# Patient Record
Sex: FEMALE | Race: BLACK OR AFRICAN AMERICAN | NOT HISPANIC OR LATINO | Employment: OTHER | URBAN - METROPOLITAN AREA
[De-identification: names, ages, dates, MRNs, and addresses within clinical notes are randomized per-mention and may not be internally consistent; named-entity substitution may affect disease eponyms.]

---

## 2020-03-12 ENCOUNTER — APPOINTMENT (OUTPATIENT)
Dept: LAB | Facility: HOSPITAL | Age: 40
End: 2020-03-12
Payer: COMMERCIAL

## 2020-03-12 ENCOUNTER — TRANSCRIBE ORDERS (OUTPATIENT)
Dept: LAB | Facility: HOSPITAL | Age: 40
End: 2020-03-12

## 2020-03-12 DIAGNOSIS — N93.9 ABNORMAL UTERINE BLEEDING: Primary | ICD-10-CM

## 2020-03-12 DIAGNOSIS — N93.9 ABNORMAL UTERINE BLEEDING: ICD-10-CM

## 2020-03-12 LAB
B-HCG SERPL-ACNC: ABNORMAL MIU/ML
ERYTHROCYTE [DISTWIDTH] IN BLOOD BY AUTOMATED COUNT: 15.6 % (ref 11.6–15.1)
HCT VFR BLD AUTO: 30.7 % (ref 34.8–46.1)
HGB BLD-MCNC: 9.3 G/DL (ref 11.5–15.4)
MCH RBC QN AUTO: 23.7 PG (ref 26.8–34.3)
MCHC RBC AUTO-ENTMCNC: 30.3 G/DL (ref 31.4–37.4)
MCV RBC AUTO: 78 FL (ref 82–98)
PLATELET # BLD AUTO: 297 THOUSANDS/UL (ref 149–390)
PMV BLD AUTO: 9.2 FL (ref 8.9–12.7)
RBC # BLD AUTO: 3.92 MILLION/UL (ref 3.81–5.12)
TSH SERPL DL<=0.05 MIU/L-ACNC: 0.54 UIU/ML (ref 0.36–3.74)
WBC # BLD AUTO: 4.71 THOUSAND/UL (ref 4.31–10.16)

## 2020-03-12 PROCEDURE — 84443 ASSAY THYROID STIM HORMONE: CPT

## 2020-03-12 PROCEDURE — 85027 COMPLETE CBC AUTOMATED: CPT

## 2020-03-12 PROCEDURE — 36415 COLL VENOUS BLD VENIPUNCTURE: CPT

## 2020-03-12 PROCEDURE — 84702 CHORIONIC GONADOTROPIN TEST: CPT

## 2020-07-09 ENCOUNTER — TRANSCRIBE ORDERS (OUTPATIENT)
Dept: LAB | Facility: HOSPITAL | Age: 40
End: 2020-07-09

## 2020-07-09 DIAGNOSIS — O21.9 NAUSEA AND VOMITING IN PREGNANCY: ICD-10-CM

## 2020-07-09 DIAGNOSIS — O09.91 HIGH-RISK PREGNANCY, FIRST TRIMESTER: Primary | ICD-10-CM

## 2020-07-09 DIAGNOSIS — O09.521 MULTIGRAVIDA OF ADVANCED MATERNAL AGE IN FIRST TRIMESTER: ICD-10-CM

## 2020-07-09 DIAGNOSIS — Z36.9 ENCOUNTER FOR ANTENATAL SCREENING OF MOTHER: ICD-10-CM

## 2020-07-09 DIAGNOSIS — Z78.9 VEGETARIAN: ICD-10-CM

## 2021-08-11 ENCOUNTER — HOSPITAL ENCOUNTER (EMERGENCY)
Facility: HOSPITAL | Age: 41
Discharge: HOME | End: 2021-08-11
Attending: EMERGENCY MEDICINE
Payer: COMMERCIAL

## 2021-08-11 VITALS
BODY MASS INDEX: 25.76 KG/M2 | SYSTOLIC BLOOD PRESSURE: 142 MMHG | OXYGEN SATURATION: 98 % | RESPIRATION RATE: 18 BRPM | WEIGHT: 140 LBS | HEART RATE: 105 BPM | DIASTOLIC BLOOD PRESSURE: 85 MMHG | TEMPERATURE: 97.5 F | HEIGHT: 62 IN

## 2021-08-11 DIAGNOSIS — M54.6 ACUTE MIDLINE THORACIC BACK PAIN: Primary | ICD-10-CM

## 2021-08-11 PROCEDURE — 99284 EMERGENCY DEPT VISIT MOD MDM: CPT | Performed by: EMERGENCY MEDICINE

## 2021-08-11 PROCEDURE — 99283 EMERGENCY DEPT VISIT LOW MDM: CPT

## 2021-08-11 RX ORDER — SENNOSIDES 8.6 MG
650 CAPSULE ORAL EVERY 8 HOURS PRN
Qty: 30 TABLET | Refills: 0 | Status: SHIPPED | OUTPATIENT
Start: 2021-08-11

## 2021-08-11 RX ORDER — LIDOCAINE 50 MG/G
1 PATCH TOPICAL DAILY
Qty: 30 PATCH | Refills: 0 | Status: SHIPPED | OUTPATIENT
Start: 2021-08-11

## 2021-08-12 NOTE — ED ATTENDING ATTESTATION
8/11/2021  IManihs MD, saw and evaluated the patient  I have discussed the patient with the resident/non-physician practitioner and agree with the resident's/non-physician practitioner's findings, Plan of Care, and MDM as documented in the resident's/non-physician practitioner's note, except where noted  All available labs and Radiology studies were reviewed  I was present for key portions of any procedure(s) performed by the resident/non-physician practitioner and I was immediately available to provide assistance  At this point I agree with the current assessment done in the Emergency Department  I have conducted an independent evaluation of this patient a history and physical is as follows:    ED Course         Critical Care Time  Procedures    36 yo female no pmh, c/o intermittent back pain mid thoracic for two days, transient  Pt states a burning sensation  No numbness, tingling, weakness  No headaches, no recent trauma  Pt with similar pain previously  No current pain  Vss, afebrile, lungs cta, rrr, abdomen soft nontender, no rash, tender mid thoracic area  msk pain, reassurance

## 2021-08-12 NOTE — ED PROVIDER NOTES
History  Chief Complaint   Patient presents with    Back Pain     pt c/o "burning" in the middle of her back for a few days     Luke Michelle is a 35y o  year old female presenting to the [facility] ED for back pain  Patient has had two days of intermittent back pain  Radiating along midline from cervical to lumbar region  Patient had two episodes today which lasted 3-4 minutes and self resolved  No pain at time of evaluation  Patient has had similar pain previously, last reported one year ago  Patient denies associated weakness/numbness/tingling in extremities  The patient has not taken/received any medications at home for relief of symptoms  Patient denies fevers, chest pain, dyspnea, cough, abdominal pain, new-onset back pain, leg pain/swelling    Patient denies bowel/bladder incontinence, saddle anesthesia  Patient denies recent trauma to the area  Denies unexplained fever/night sweats, weight loss, neurologic symptoms, urinary retention  No history of bacteremia  Denies Hx of IVDU  No history of chronic steroid nor hx of cancer (breast, renal, lung)  History provided by:  Patient   used: No    Back Pain  Associated symptoms: no abdominal pain, no chest pain, no dysuria, no fever and no weakness        None       History reviewed  No pertinent past medical history  History reviewed  No pertinent surgical history  History reviewed  No pertinent family history  I have reviewed and agree with the history as documented  E-Cigarette/Vaping     E-Cigarette/Vaping Substances     Social History     Tobacco Use    Smoking status: Never Smoker    Smokeless tobacco: Never Used   Substance Use Topics    Alcohol use: Not on file    Drug use: Not on file        Review of Systems   Constitutional: Negative for chills and fever  HENT: Negative for congestion and rhinorrhea  Eyes: Negative for visual disturbance  Respiratory: Negative for cough and shortness of breath  Cardiovascular: Negative for chest pain and leg swelling  Gastrointestinal: Negative for abdominal distention, abdominal pain, constipation, diarrhea, nausea and vomiting  Endocrine: Negative for polyuria  Genitourinary: Negative for difficulty urinating, dysuria and flank pain  Musculoskeletal: Positive for back pain  Negative for arthralgias, neck pain and neck stiffness  Skin: Negative for rash  Neurological: Negative for syncope, weakness and light-headedness  Psychiatric/Behavioral: Negative for behavioral problems and confusion  All other systems reviewed and are negative  Physical Exam  ED Triage Vitals [08/11/21 2159]   Temperature Pulse Respirations Blood Pressure SpO2   97 5 °F (36 4 °C) 105 18 142/85 98 %      Temp Source Heart Rate Source Patient Position - Orthostatic VS BP Location FiO2 (%)   Oral -- Lying Right arm --      Pain Score       9             Orthostatic Vital Signs  Vitals:    08/11/21 2159   BP: 142/85   Pulse: 105   Patient Position - Orthostatic VS: Lying       Physical Exam  Vitals and nursing note reviewed  Constitutional:       General: She is not in acute distress  Appearance: Normal appearance  She is well-developed  She is not ill-appearing, toxic-appearing or diaphoretic  HENT:      Head: Normocephalic and atraumatic  Nose: No congestion or rhinorrhea  Eyes:      General:         Right eye: No discharge  Left eye: No discharge  Cardiovascular:      Rate and Rhythm: Normal rate and regular rhythm  Pulmonary:      Effort: Pulmonary effort is normal  No accessory muscle usage or respiratory distress  Breath sounds: Normal breath sounds  No stridor  No decreased breath sounds, wheezing, rhonchi or rales  Abdominal:      General: There is no distension  Palpations: Abdomen is soft  Tenderness: There is no abdominal tenderness  There is no right CVA tenderness, left CVA tenderness, guarding or rebound  Musculoskeletal:      Cervical back: Normal range of motion and neck supple  No rigidity, tenderness or bony tenderness  Normal range of motion  Thoracic back: Edema and tenderness (paraspinal) present  No swelling or bony tenderness  Normal range of motion  Lumbar back: No tenderness  Normal range of motion  Right lower leg: No tenderness  No edema  Left lower leg: No tenderness  No edema  Skin:     Capillary Refill: Capillary refill takes less than 2 seconds  Findings: No rash  Neurological:      Mental Status: She is alert and oriented to person, place, and time  GCS: GCS eye subscore is 4  GCS verbal subscore is 5  GCS motor subscore is 6  Cranial Nerves: No dysarthria or facial asymmetry  Motor: No weakness or tremor  Gait: Gait normal       Comments: 5/5 strength b/l UE/LE  Sensation grossly intact throughout  Psychiatric:         Mood and Affect: Mood normal          Behavior: Behavior normal          ED Medications  Medications - No data to display    Diagnostic Studies  Results Reviewed     None                 No orders to display         Procedures  Procedures      ED Course                                       MDM  Number of Diagnoses or Management Options  Acute midline thoracic back pain  Diagnosis management comments:   35 y o  female presenting for back pain  Exam reassuring, no focal neurologic deficit  Likely MSK strain  Will treat symptomatically at this time in absence of neurologic deficit  I have discussed with the patient our plan to discharge them from the ED and the patient is in agreement with this plan  The patient was provided a written after visit summary with strict RTED precautions  Followup: I have discussed with the patient plan to follow up with their PCP and comprehensive [facility]   Contact information provided in AVS        Amount and/or Complexity of Data Reviewed  Review and summarize past medical records: yes    Patient Progress  Patient progress: stable      Disposition  Final diagnoses:   Acute midline thoracic back pain     Time reflects when diagnosis was documented in both MDM as applicable and the Disposition within this note     Time User Action Codes Description Comment    8/11/2021 10:37 PM Rl Garay Add [M54 6] Acute midline thoracic back pain       ED Disposition     ED Disposition Condition Date/Time Comment    Discharge Stable Wed Aug 11, 2021 10:53 PM Herson Dobbs discharge to home/self care  Follow-up Information     Follow up With Specialties Details Why Contact Info Additional Information    [facility] Physical Therapy Call  To make appointment for reevaluation in 3-5 days  [phone] [address] [facility] Internal Medicine Call  To establish care  [address] [facility] [address]  [facility] [address], [address], [address] [phone]          Discharge Medication List as of 8/11/2021 10:56 PM      START taking these medications    Details   acetaminophen (TYLENOL) 650 mg CR tablet Take 1 tablet (650 mg total) by mouth every 8 (eight) hours as needed for mild pain, Starting Wed 8/11/2021, Print      lidocaine (LIDODERM) 5 % Apply 1 patch topically daily Remove & Discard patch within 12 hours or as directed by MD, Starting Wed 8/11/2021, Print           No discharge procedures on file  PDMP Review     None           ED Provider  Attending physically available and evaluated Herson Dobbs I managed the patient along with the ED Attending      Electronically Signed by         Pipo Bradley DO  08/13/21 1300

## 2021-08-12 NOTE — DISCHARGE INSTRUCTIONS
You have been seen for back pain  Please take tylenol and apply lidocaine patch as needed if symptoms return  Return to the emergency department if you develop worsening pain, weakness, incontinence or any other symptoms of concern  Please follow up with comprehensive [facility] by calling the number provided

## 2023-11-28 ENCOUNTER — HOSPITAL ENCOUNTER (EMERGENCY)
Facility: HOSPITAL | Age: 43
Discharge: HOME | End: 2023-11-28
Attending: EMERGENCY MEDICINE | Admitting: EMERGENCY MEDICINE
Payer: COMMERCIAL

## 2023-11-28 VITALS
SYSTOLIC BLOOD PRESSURE: 136 MMHG | DIASTOLIC BLOOD PRESSURE: 100 MMHG | HEART RATE: 100 BPM | TEMPERATURE: 97.7 F | RESPIRATION RATE: 17 BRPM | OXYGEN SATURATION: 100 %

## 2023-11-28 DIAGNOSIS — K04.7 DENTAL ABSCESS: Primary | ICD-10-CM

## 2023-11-28 PROCEDURE — 99282 EMERGENCY DEPT VISIT SF MDM: CPT

## 2023-11-28 PROCEDURE — 96372 THER/PROPH/DIAG INJ SC/IM: CPT

## 2023-11-28 PROCEDURE — 99284 EMERGENCY DEPT VISIT MOD MDM: CPT | Performed by: EMERGENCY MEDICINE

## 2023-11-28 RX ORDER — KETOROLAC TROMETHAMINE 30 MG/ML
15 INJECTION, SOLUTION INTRAMUSCULAR; INTRAVENOUS ONCE
Status: COMPLETED | OUTPATIENT
Start: 2023-11-28 | End: 2023-11-28

## 2023-11-28 RX ORDER — ACETAMINOPHEN 325 MG/1
650 TABLET ORAL ONCE
Status: COMPLETED | OUTPATIENT
Start: 2023-11-28 | End: 2023-11-28

## 2023-11-28 RX ORDER — AMOXICILLIN AND CLAVULANATE POTASSIUM 875; 125 MG/1; MG/1
1 TABLET, FILM COATED ORAL ONCE
Status: COMPLETED | OUTPATIENT
Start: 2023-11-28 | End: 2023-11-28

## 2023-11-28 RX ORDER — AMOXICILLIN AND CLAVULANATE POTASSIUM 875; 125 MG/1; MG/1
1 TABLET, FILM COATED ORAL EVERY 12 HOURS
Qty: 14 TABLET | Refills: 0 | Status: SHIPPED | OUTPATIENT
Start: 2023-11-28 | End: 2023-12-05

## 2023-11-28 RX ADMIN — KETOROLAC TROMETHAMINE 15 MG: 30 INJECTION, SOLUTION INTRAMUSCULAR; INTRAVENOUS at 14:52

## 2023-11-28 RX ADMIN — AMOXICILLIN AND CLAVULANATE POTASSIUM 1 TABLET: 875; 125 TABLET, FILM COATED ORAL at 14:51

## 2023-11-28 RX ADMIN — ACETAMINOPHEN 650 MG: 325 TABLET, FILM COATED ORAL at 14:51

## 2023-11-28 NOTE — DISCHARGE INSTRUCTIONS
You were seen in the Emergency Department today for a dental infection. Please follow up with [facility] tomorrow. Please return to the Emergency Department if you experience worsening of your current symptoms, fever, drooling, difficulty breathing, or any other concerning symptoms.

## 2023-11-28 NOTE — ED PROVIDER NOTES
History  Chief Complaint   Patient presents with    Facial Swelling     Pt presents with right sided facial swelling x 2 days. Denies airway/neck involvement     HPI    Patient is a 47-year-old female with no significant history who presents with right-sided facial swelling. Patient states that she has a broken tooth on the right side about 2 days ago started to notice facial swelling. Today the pain worsened. She has not tried any pain medications for this. She has a dental appointment tomorrow. She denies fever, difficulty tolerating secretions, neck stiffness, difficulty opening her mouth, and shortness of breath. Prior to Admission Medications   Prescriptions Last Dose Informant Patient Reported? Taking?   acetaminophen (TYLENOL) 650 mg CR tablet   No No   Sig: Take 1 tablet (650 mg total) by mouth every 8 (eight) hours as needed for mild pain   lidocaine (LIDODERM) 5 %   No No   Sig: Apply 1 patch topically daily Remove & Discard patch within 12 hours or as directed by MD      Facility-Administered Medications: None       No past medical history on file. No past surgical history on file. No family history on file. I have reviewed and agree with the history as documented. E-Cigarette/Vaping     E-Cigarette/Vaping Substances     Social History     Tobacco Use    Smoking status: Never    Smokeless tobacco: Never        Review of Systems   Constitutional:  Negative for chills and fever. HENT:  Positive for dental problem. Negative for trouble swallowing. Eyes:  Negative for photophobia and visual disturbance. Respiratory:  Negative for cough and shortness of breath. Cardiovascular:  Negative for chest pain and palpitations. Gastrointestinal:  Negative for abdominal pain and vomiting. Genitourinary:  Negative for dysuria and hematuria. Musculoskeletal:  Negative for back pain and neck pain. Skin:  Negative for rash and wound. Neurological:  Negative for syncope and headaches. All other systems reviewed and are negative. Physical Exam  ED Triage Vitals [11/28/23 1229]   Temperature Pulse Respirations Blood Pressure SpO2   97.7 °F (36.5 °C) 100 17 136/100 100 %      Temp Source Heart Rate Source Patient Position - Orthostatic VS BP Location FiO2 (%)   Tympanic Monitor Lying Right arm --      Pain Score       7             Orthostatic Vital Signs  Vitals:    11/28/23 1229   BP: 136/100   Pulse: 100   Patient Position - Orthostatic VS: Lying       Physical Exam  Vitals and nursing note reviewed. Constitutional:       General: She is not in acute distress. Appearance: She is well-developed. HENT:      Head: Normocephalic and atraumatic. Nose: No congestion or rhinorrhea. Mouth/Throat:      Mouth: Mucous membranes are moist.      Comments: Right lower jaw swelling present. Right lower molar broken. No sublingual swelling. No peritonsillar abscess. No drooling or trismus. No cervical lymphadenopathy. Eyes:      Extraocular Movements: Extraocular movements intact. Conjunctiva/sclera: Conjunctivae normal.   Cardiovascular:      Rate and Rhythm: Normal rate and regular rhythm. Heart sounds: No murmur heard. Pulmonary:      Effort: Pulmonary effort is normal. No respiratory distress. Breath sounds: Normal breath sounds. Abdominal:      Palpations: Abdomen is soft. Tenderness: There is no abdominal tenderness. Musculoskeletal:         General: No swelling. Cervical back: Neck supple. Skin:     General: Skin is warm and dry. Capillary Refill: Capillary refill takes less than 2 seconds. Neurological:      Mental Status: She is alert.    Psychiatric:         Mood and Affect: Mood normal.         ED Medications  Medications   acetaminophen (TYLENOL) tablet 650 mg (650 mg Oral Given 11/28/23 1451)   ketorolac (TORADOL) injection 15 mg (15 mg Intramuscular Given 11/28/23 1452)   amoxicillin-clavulanate (AUGMENTIN) 875-125 mg per tablet 1 tablet (1 tablet Oral Given 11/28/23 5141)       Diagnostic Studies  Results Reviewed       None                   No orders to display         Procedures  Procedures      ED Course                                       Medical Decision Making  Patient is a 28-year-old female with no significant history who presents with right-sided facial swelling. Patient differential includes abscess versus cellulitis. Low suspicion for deep space infection given lack of trismus, drooling, decreased range of motion. She has had no respiratory symptoms. Patient states that she has an appointment with her dentist tomorrow. Will treat infection with Augmentin. Will give her a dose of Tylenol and Toradol for pain management. She was encouraged to keep her appointment with her dentist tomorrow. She was given return precautions and discharged in the ED. Risk  OTC drugs. Prescription drug management. Disposition  Final diagnoses:   Dental abscess     Time reflects when diagnosis was documented in both MDM as applicable and the Disposition within this note       Time User Action Codes Description Comment    11/28/2023  3:10 PM Risa Bourdon Add [K04.7] Dental infection     11/28/2023  3:11 PM Risa Bourdon Remove [K04.7] Dental infection     11/28/2023  3:11 PM Risa Bourdon Add [K04.7] Dental abscess           ED Disposition       ED Disposition   Discharge    Condition   Stable    Date/Time   Tue Nov 28, 2023  3:10 PM    Comment   Mayra Hoffman discharge to home/self care.                    Follow-up Information       Follow up With Specialties Details Why Contact Info    Edmundo Saldana DMD Oral Maxillofacial Surgery   [address] [place] [address].      Edmundo Saldana DMD Oral Maxillofacial Surgery   [address] [place] [address] [place]  [phone]              Discharge Medication List as of 11/28/2023  3:12 PM        START taking these medications    Details amoxicillin-clavulanate (AUGMENTIN) 875-125 mg per tablet Take 1 tablet by mouth every 12 (twelve) hours for 7 days, Starting Tue 11/28/2023, Until Tue 12/5/2023, Normal           CONTINUE these medications which have NOT CHANGED    Details   acetaminophen (TYLENOL) 650 mg CR tablet Take 1 tablet (650 mg total) by mouth every 8 (eight) hours as needed for mild pain, Starting Wed 8/11/2021, Print      lidocaine (LIDODERM) 5 % Apply 1 patch topically daily Remove & Discard patch within 12 hours or as directed by MD, Starting Wed 8/11/2021, Print               PDMP Review       None             ED Provider  Attending physically available and evaluated Jillian Osorio. I managed the patient along with the ED Attending.     Electronically Signed by           Blanca Baeza MD  12/01/23 0628

## 2023-11-29 ENCOUNTER — OFFICE VISIT (OUTPATIENT)
Dept: DENTISTRY | Facility: CLINIC | Age: 43
End: 2023-11-29

## 2023-11-29 DIAGNOSIS — K04.7 DENTAL ABSCESS: Primary | ICD-10-CM

## 2023-11-29 PROCEDURE — D0330 PANORAMIC RADIOGRAPHIC IMAGE: HCPCS | Performed by: DENTIST

## 2023-11-29 PROCEDURE — D0140 LIMITED ORAL EVALUATION - PROBLEM FOCUSED: HCPCS | Performed by: DENTIST

## 2023-11-29 NOTE — PROGRESS NOTES
Zeenat Alexander. , 37 y.o came with c/o pain and swelling in reln to LR side since few days. Pain level: 6 today (was 10)  Pt taking antib (amox 500mg, given to her in the ER  Med hx revd: nsf noted, ASA I  NKDA     O/E:     E/O:   Fluctuant swelling in reln to Lower rt. side of the mandible since few days, pt afebrile. Pain on palpation lower rt side submandibular, submental lymph nodes. Pt able to open and close her mouth , with some difficulty. I/O;  Poor ohi  Visible swelling and severe inflammation in reln to #30,31 root tips, and decay #32   PAN taken, shows : significant pap #30,31, decayed #32, root tip #18, decayed 16,17  Adv : Continue antib, take otc prn pain, STAT ref to see OS was given for Incision and drainage in LR area, extrn #69,44,53 and extrn of non restorable 16,17 and ,18(root tip)    POI given to pt. And adv her to return for Comp exam after OS appt. Pt left the off comf and ambulatory.      NV: Comp exam

## 2023-12-12 ENCOUNTER — TELEPHONE (OUTPATIENT)
Dept: DENTISTRY | Facility: CLINIC | Age: 43
End: 2023-12-12